# Patient Record
Sex: FEMALE | Race: WHITE
[De-identification: names, ages, dates, MRNs, and addresses within clinical notes are randomized per-mention and may not be internally consistent; named-entity substitution may affect disease eponyms.]

---

## 2019-04-12 ENCOUNTER — HOSPITAL ENCOUNTER (EMERGENCY)
Dept: HOSPITAL 25 - UCCORT | Age: 34
Discharge: HOME | End: 2019-04-12
Payer: COMMERCIAL

## 2019-04-12 VITALS — DIASTOLIC BLOOD PRESSURE: 84 MMHG | SYSTOLIC BLOOD PRESSURE: 135 MMHG

## 2019-04-12 DIAGNOSIS — Z87.891: ICD-10-CM

## 2019-04-12 DIAGNOSIS — Z88.0: ICD-10-CM

## 2019-04-12 DIAGNOSIS — R07.9: Primary | ICD-10-CM

## 2019-04-12 PROCEDURE — G0463 HOSPITAL OUTPT CLINIC VISIT: HCPCS

## 2019-04-12 PROCEDURE — 99201: CPT

## 2019-04-12 PROCEDURE — 71046 X-RAY EXAM CHEST 2 VIEWS: CPT

## 2019-04-12 NOTE — UC
Cardiac HPI





- HPI Summary


HPI Summary: 


33-year-old woman comes in with a chief complaint of chest pain.  Upper and 

lateral chest achiness is how the patient describes it.  I worse is about a 4 

out of 10 right now it's been a while 10.  She's had this chest sensation full-

time for 3 days.  No fevers chills no cough no chest congestion.  Patient does 

not feel nauseous sweaty or short of breath.  No palpitations.  Occasionally 

she does get a sensation into her left arm.  Activity does not make it worse.  

Neither it is laying down or taking a deep breath or moving her arm.  She does 

have a significant medical history and that her father passed away at 42 of a 

heart attack.  She has an aunt who also passed away in her 40s from a heart 

attack.  Patient herself does not have high blood pressure high cholesterol or 

diabetes.  She is a former smoker she quit about 10 years ago.  She has an IUD.

  She has no history of DVT or PE.  Today she has no calf pain or calf swelling.





- History of Current Complaint


Chief Complaint: UCChestPain


Stated Complaint: CHEST ACHEY


Hx Last Menstrual Period: 3/2019


Pain Intensity: 2





- Allergy/Home Medications


Allergies/Adverse Reactions: 


 Allergies











Allergy/AdvReac Type Severity Reaction Status Date / Time


 


Penicillins Allergy  Rash Verified 19 13:41











Home Medications: 


 Home Medications





Multivit-Min/Folic Acid/Biotin [Hair, Skin and Nails Caplet] 1 each PO DAILY  [History Confirmed 19]











PMH/Surg Hx/FS Hx/Imm Hx


Previously Healthy: Yes





- Surgical History


Surgical History: None





- Family History


Known Family History: Positive: Cardiac Disease





- Social History


Alcohol Use: Occasionally


Substance Use Type: None


Smoking Status (MU): Former Smoker





Review of Systems


All Other Systems Reviewed And Are Negative: Yes


Constitutional: Positive: Negative


Skin: Positive: Negative


Eyes: Positive: Negative


ENT: Positive: Negative


Respiratory: Positive: Negative


Cardiovascular: Positive: Chest Pain


Gastrointestinal: Positive: Negative


Motor: Positive: Negative


Neurovascular: Positive: Negative


Musculoskeletal: Positive: Negative


Neurological: Positive: Negative


Psychological: Positive: Anxious


Is Patient Immunocompromised?: No





Physical Exam


Triage Information Reviewed: Yes


Appearance: Well-Appearing, No Pain Distress, Well-Nourished


Vital Signs: 


 Initial Vital Signs











Temp  98.9 F   19 13:42


 


Pulse  84   19 13:42


 


Resp  20   19 13:42


 


BP  135/84   19 13:42


 


Pulse Ox  100   19 13:42











Vital Signs Reviewed: Yes


Eye Exam: Normal


Eyes: Positive: Conjunctiva Clear


Neck: Positive: Supple


Respiratory: Positive: Lungs clear, Normal breath sounds, No respiratory 

distress


Cardiovascular: Positive: RRR


Abdomen Description: Positive: Nontender, Soft


Bowel Sounds: Positive: Present


Musculoskeletal Exam: Normal


Musculoskeletal: Positive: Strength Intact, ROM Intact, No Edema, Other: - no 

calf tenderness


Neurological: Positive: Alert, Muscle Tone Normal


Psychological Exam: Normal


Psychological: Positive: Age Appropriate Behavior


Skin Exam: Normal





Diagnostics





- EKG


Cardiac Rate: NL - at 1349


Cardiac Rhythm: Sinus: Normal - 69 bpm


Ectopy: None


ST Segment: Normal





- Assessment/Plan


Course Of Treatment: 





Patient Name: MAGY KENNY Medical Record#: [id number]


Ordering Physician: Franco Camacho MD Acct.#: [id number]


: 1985 Age: 33 Sex: F Location: URGENT CARE [facility]


Exam Date: 19 ADM Status: PRE ER





Order Information: CHEST PA LAT 2 VWS


Accession Number: [id number]


CPT: [id number]


HISTORY: chest pressure left





COMPARISONS: None relevant available at the time of dictation.





VIEWS: 4: Frontal dual-energy and lateral views of the chest.





FINDINGS:


CARDIOMEDIASTINAL SILHOUETTE: The cardiomediastinal silhouette is normal.


STEPHANIE: The stephanie are normal.


PLEURA: The costophrenic angles are sharp. No pleural abnormalities are noted.


LUNG PARENCHYMA: The lungs are clear.


ABDOMEN: The upper abdomen is clear. There is no subphrenic gas.


BONES AND SOFT TISSUES: No bone or soft tissue abnormalities are noted.


OTHER: None.





IMPRESSION:


NO ACTIVE CARDIOPULMONARY DISEASE.











____________________________________________________________


<Electronically signed by Philippe Desai MD in OV> [id number]





I discussed the x-ray and EKG with the patient.  Both of these are normal.  

Patient has no associated symptoms with her chest tightness.  She is not sweaty 

short of breath or nauseous.  The discomfort is not changed with activity she 

has no fevers.  She has no calf tenderness or calf swelling she's low risk for 

PE or DVT.  At this time I recommended following up with primary care doctor.  

We also discussed that if her symptoms worsened or she had any concerns she 

started to go the emergency department for further evaluation and care.





- Clinical Impression


Provider Diagnosis: 


 Chest pain








Discharge





- Sign-Out/Discharge


Documenting (check all that apply): Patient Departure


All imaging exams completed and their final reports reviewed: Yes





- Discharge Plan


Condition: Stable


Disposition: HOME


Patient Education Materials:  Chest Pain (ED)


Referrals: 


[facility] [place] PHYSICIAN REFERRAL [Outside]


Additional Instructions: 


FOLLOW UP WITH YOUR DOCTOR IF NOT COMPLETELY IMPROVED. 


GO TO THE EMERGENCY DEPARTMENT FOR WORSENING OF YOUR CONDITION; CHEST PAIN, 

SHORTNESS OF BREATH, YOU FEEL ILL, YOU FEEL LIKE PASSING OUT OR QUESTIONS OR 

CONCERNS.





- Billing Disposition and Condition


Condition: STABLE


Disposition: Home